# Patient Record
Sex: FEMALE | Race: WHITE | URBAN - METROPOLITAN AREA
[De-identification: names, ages, dates, MRNs, and addresses within clinical notes are randomized per-mention and may not be internally consistent; named-entity substitution may affect disease eponyms.]

---

## 2023-10-20 ENCOUNTER — EVALUATION (OUTPATIENT)
Dept: PHYSICAL THERAPY | Age: 63
End: 2023-10-20

## 2023-10-20 DIAGNOSIS — M25.551 BILATERAL HIP PAIN: ICD-10-CM

## 2023-10-20 DIAGNOSIS — M25.661 DECREASED RANGE OF MOTION (ROM) OF BOTH KNEES: ICD-10-CM

## 2023-10-20 DIAGNOSIS — M25.552 BILATERAL HIP PAIN: ICD-10-CM

## 2023-10-20 DIAGNOSIS — M25.662 DECREASED RANGE OF MOTION (ROM) OF BOTH KNEES: ICD-10-CM

## 2023-10-20 DIAGNOSIS — M25.561 PAIN IN BOTH KNEES, UNSPECIFIED CHRONICITY: Primary | ICD-10-CM

## 2023-10-20 DIAGNOSIS — M25.562 PAIN IN BOTH KNEES, UNSPECIFIED CHRONICITY: Primary | ICD-10-CM

## 2023-10-26 ENCOUNTER — TREATMENT (OUTPATIENT)
Dept: PHYSICAL THERAPY | Age: 63
End: 2023-10-26

## 2023-10-26 DIAGNOSIS — M25.551 BILATERAL HIP PAIN: ICD-10-CM

## 2023-10-26 DIAGNOSIS — M25.662 DECREASED RANGE OF MOTION (ROM) OF BOTH KNEES: ICD-10-CM

## 2023-10-26 DIAGNOSIS — M25.661 DECREASED RANGE OF MOTION (ROM) OF BOTH KNEES: ICD-10-CM

## 2023-10-26 DIAGNOSIS — M25.552 BILATERAL HIP PAIN: ICD-10-CM

## 2023-10-26 DIAGNOSIS — M25.561 PAIN IN BOTH KNEES, UNSPECIFIED CHRONICITY: Primary | ICD-10-CM

## 2023-10-26 DIAGNOSIS — M25.562 PAIN IN BOTH KNEES, UNSPECIFIED CHRONICITY: Primary | ICD-10-CM

## 2023-10-26 NOTE — PROGRESS NOTES
[facility]            [address] [place] [address]              B:[phone]  A:[phone]      Physical Therapy: TREATMENT/PROGRESS NOTE   Patient: Cookie Milan (21 y.o. female)   Treatment Date: 10/26/2023   :  1960 MRN: [id number]   Visit #: 2   Insurance Allowable Auth Needed   90 []Yes    [x]No    Insurance: Payor: Gamaliel Black / Plan: Gamaliel Led - [place] PPO / Product Type: *No Product type* /   Insurance ID: [id number] - ([address] [place] [address])  Secondary Insurance (if applicable):    Treatment Diagnosis: B knee pain (M25.561/M25.562); B hip pain (M25.551, M25.552)    ICD-10-CM    1. Pain in both knees, unspecified chronicity  M25.561     M25.562       2. Bilateral hip pain  M25.551     M25.552       3. Decreased range of motion (ROM) of both knees  M25.661     M25.662          Medical Diagnosis:    No admission diagnoses are documented for this encounter. Referring Physician: Cynthia London MD  PCP: No primary care provider on file. Plan of care signed (Y/N): N    Date of Patient follow up with Physician: PRN     Progress Report/POC: NO  POC update due: 2023 (OR 10 visits /OR [address], whichever is less)    B hip and B knee OA (Cut and paste Precautions/Contraindications from [employer])    Preferred Language for Healthcare:   [x]English       []other:    SUBJECTIVE EXAMINATION     Patient Report/Comments: Patient reports that she is doing really well today and feels that the exercises are helping a lot already. She notes that she has also still been in the pool and feels like she might have over done it a little bit yesterday. Patient is also asking if there is an alternative stretching position for the HS for her HEP.       Test used Initial score  10/20/23 10/26/2023   Pain Summary VAS 6-8/10 (knees; 6-8/10 (hips) 2-3/10   Functional questionnaire LEFS 40/80;  50% limitation    Other:                OBJECTIVE EXAMINATION

## 2023-11-01 ENCOUNTER — TREATMENT (OUTPATIENT)
Dept: PHYSICAL THERAPY | Age: 63
End: 2023-11-01

## 2023-11-01 DIAGNOSIS — M25.561 PAIN IN BOTH KNEES, UNSPECIFIED CHRONICITY: Primary | ICD-10-CM

## 2023-11-01 DIAGNOSIS — M25.551 BILATERAL HIP PAIN: ICD-10-CM

## 2023-11-01 DIAGNOSIS — M25.661 DECREASED RANGE OF MOTION (ROM) OF BOTH KNEES: ICD-10-CM

## 2023-11-01 DIAGNOSIS — M25.662 DECREASED RANGE OF MOTION (ROM) OF BOTH KNEES: ICD-10-CM

## 2023-11-01 DIAGNOSIS — M25.552 BILATERAL HIP PAIN: ICD-10-CM

## 2023-11-01 DIAGNOSIS — M25.562 PAIN IN BOTH KNEES, UNSPECIFIED CHRONICITY: Primary | ICD-10-CM

## 2023-11-13 ENCOUNTER — TREATMENT (OUTPATIENT)
Dept: PHYSICAL THERAPY | Age: 63
End: 2023-11-13
Payer: COMMERCIAL

## 2023-11-13 DIAGNOSIS — M25.551 BILATERAL HIP PAIN: ICD-10-CM

## 2023-11-13 DIAGNOSIS — M25.662 DECREASED RANGE OF MOTION (ROM) OF BOTH KNEES: ICD-10-CM

## 2023-11-13 DIAGNOSIS — M25.552 BILATERAL HIP PAIN: ICD-10-CM

## 2023-11-13 DIAGNOSIS — M25.661 DECREASED RANGE OF MOTION (ROM) OF BOTH KNEES: ICD-10-CM

## 2023-11-13 DIAGNOSIS — M25.561 PAIN IN BOTH KNEES, UNSPECIFIED CHRONICITY: Primary | ICD-10-CM

## 2023-11-13 DIAGNOSIS — M25.562 PAIN IN BOTH KNEES, UNSPECIFIED CHRONICITY: Primary | ICD-10-CM

## 2023-11-13 PROCEDURE — 97112 NEUROMUSCULAR REEDUCATION: CPT | Performed by: PHYSICAL THERAPIST

## 2023-11-13 PROCEDURE — 97110 THERAPEUTIC EXERCISES: CPT | Performed by: PHYSICAL THERAPIST

## 2023-11-13 PROCEDURE — 97530 THERAPEUTIC ACTIVITIES: CPT | Performed by: PHYSICAL THERAPIST

## 2023-11-13 NOTE — PROGRESS NOTES
[facility]            [address] [place] [address]              V:[phone]  Y:[phone]      Physical Therapy: TREATMENT/PROGRESS NOTE   Patient: Aminata Alejandro (88 y.o. female)   Treatment Date: 2023   :  1960 MRN: [id number]   Visit #: 4   Insurance Allowable Auth Needed   90 []Yes    [x]No    Insurance: Payor: Necedah Black / Plan: [facility] PPO / Product Type: *No Product type* /   Insurance ID: [id number] - ([address] [place] [address])  Secondary Insurance (if applicable):    Treatment Diagnosis: B knee pain (M25.561/M25.562); B hip pain (M25.551, M25.552)    ICD-10-CM    1. Pain in both knees, unspecified chronicity  M25.561     M25.562       2. Bilateral hip pain  M25.551     M25.552       3. Decreased range of motion (ROM) of both knees  M25.661     M25.662          Medical Diagnosis:    No admission diagnoses are documented for this encounter. Referring Physician: Raphael Uribe MD  PCP: No primary care provider on file. Plan of care signed (Y/N): N    Date of Patient follow up with Physician: PRN     Progress Report/POC: NO  POC update due: 2023 (OR 10 visits /OR [address], whichever is less)    B hip and B knee OA (Cut and paste Precautions/Contraindications from [employer])    Preferred Language for Healthcare:   [x]English       []other:    SUBJECTIVE EXAMINATION     Patient Report/Comments: Patient reports that the last week was really good for both knees. She states that the warmer weather seems to really help. She states that she has still been working hard on her exercises at home and in the pool. She notes that she and her  went to [facility] last week and she was able to hike many of the moderate difficulty trains with mild discomfort but no debilitating pain as she had previously.           Test used Initial score  10/20/23 2023   Pain Summary VAS 6-8/10 (knees; 6-8/10 (hips) 2-3/10

## 2023-11-27 ENCOUNTER — TREATMENT (OUTPATIENT)
Dept: PHYSICAL THERAPY | Age: 63
End: 2023-11-27
Payer: COMMERCIAL

## 2023-11-27 DIAGNOSIS — M17.0 OSTEOARTHRITIS OF BOTH KNEES, UNSPECIFIED OSTEOARTHRITIS TYPE: ICD-10-CM

## 2023-11-27 DIAGNOSIS — M25.552 BILATERAL HIP PAIN: ICD-10-CM

## 2023-11-27 DIAGNOSIS — M25.551 BILATERAL HIP PAIN: ICD-10-CM

## 2023-11-27 DIAGNOSIS — M25.562 PAIN IN BOTH KNEES, UNSPECIFIED CHRONICITY: Primary | ICD-10-CM

## 2023-11-27 DIAGNOSIS — M16.0 OSTEOARTHRITIS OF BOTH HIPS, UNSPECIFIED OSTEOARTHRITIS TYPE: ICD-10-CM

## 2023-11-27 DIAGNOSIS — M25.661 DECREASED RANGE OF MOTION (ROM) OF BOTH KNEES: ICD-10-CM

## 2023-11-27 DIAGNOSIS — M25.662 DECREASED RANGE OF MOTION (ROM) OF BOTH KNEES: ICD-10-CM

## 2023-11-27 DIAGNOSIS — M25.561 PAIN IN BOTH KNEES, UNSPECIFIED CHRONICITY: Primary | ICD-10-CM

## 2023-11-27 PROCEDURE — 97112 NEUROMUSCULAR REEDUCATION: CPT | Performed by: PHYSICAL THERAPIST

## 2023-11-27 PROCEDURE — 97530 THERAPEUTIC ACTIVITIES: CPT | Performed by: PHYSICAL THERAPIST

## 2023-11-27 PROCEDURE — 97110 THERAPEUTIC EXERCISES: CPT | Performed by: PHYSICAL THERAPIST

## 2023-11-27 NOTE — PROGRESS NOTES
respectively to allow for proper joint functioning as indicated by patients functional deficits. Status: [] Progressing: [x] Partially Met: [] Not Met: [] Adjusted  Pt to improve strength to 4+/5 or better of quadriceps and hamstringsto allow for proper muscle and joint use in functional mobility, ADLs and prior level of function  Status: [] Progressing: [x] Met: [] Not Met: [] Adjusted  Patient will return to up/down 1 flight of stairs with reciprocal pattern without increased symptoms or restriction to work towards return to prior level of function. Status: [] Progressing: [x] Met: [] Not Met: [] Adjusted  Patient will be able to sleep >6 hours without disruption secondary to pain in B hips or L knee so that she may return to PLOF. Status: [] Progressing: [x] Met: [] Not Met: [] Adjusted    Overall Progression Towards Functional goals/ Treatment Progress Update:  [x] Patient is progressing as expected towards functional goals listed. [] Progression is slowed due to complexities/Impairments listed. [] Progression has been slowed due to co-morbidities. [] Plan just implemented, too soon (<30days) to assess goals progression   [] Goals require adjustment due to lack of progress  [] Patient is not progressing as expected and requires additional follow up with physician  [] Other:     CHARGE CAPTURE     CHARGE GRID   CPT Code (TIMED) minutes # CPT Code (UNTIMED) #     [x] Therex (93005)  20' 1  [] EVAL:LOW (95752 - Typically 20 minutes face-to-face)     [x] Neuromusc. Re-ed (27468) 8' 1  [] Re-Eval (19621)     [] Manual (00967)    [] Estim Unattended (60647)     [x] Ther. Act (48809) 12' 1  [] [facility]. Traction (51205)     [] Gait (48381)    [] Dry Needle 1-2 muscle (32795)     [] Aquatic Therex (12242)    [] Dry Needle 3+ muscle (67314)     [] Iontophoresis (80852)    [] VASO (67204)     [] Ultrasound (08344)    [] Group Therapy (94995)     [] Estim Attended (79300)    [] Other:     Total Timed Code Tx